# Patient Record
Sex: MALE | Race: WHITE | NOT HISPANIC OR LATINO | URBAN - METROPOLITAN AREA
[De-identification: names, ages, dates, MRNs, and addresses within clinical notes are randomized per-mention and may not be internally consistent; named-entity substitution may affect disease eponyms.]

---

## 2020-02-28 NOTE — PATIENT DISCUSSION
[facility] Visual Field 36 point screen: I have reviewed the visual fields both taped and untaped on this patient which demonstrate significant obstruction of the patient's peripheral visual field on both eyes.

## 2020-06-26 NOTE — PATIENT DISCUSSION
All sutures removed without difficulty today. Reviewed sun avoidance. Sunscreen given today. Patient will follow up in one month for continued observation.

## 2022-06-30 ENCOUNTER — TECH ONLY (OUTPATIENT)
Facility: LOCATION | Age: 77
End: 2022-06-30

## 2022-06-30 DIAGNOSIS — H52.223: ICD-10-CM

## 2022-06-30 DIAGNOSIS — H52.4: ICD-10-CM

## 2022-06-30 DIAGNOSIS — H25.813: ICD-10-CM

## 2022-06-30 DIAGNOSIS — H52.13: ICD-10-CM

## 2022-06-30 PROCEDURE — 90000 N/C VISIT: CPT

## 2022-06-30 PROCEDURE — 92025 CPTRIZED CORNEAL TOPOGRAPHY: CPT

## 2022-06-30 PROCEDURE — 92136 OPHTHALMIC BIOMETRY: CPT

## 2022-07-19 ENCOUNTER — ESTABLISHED PATIENT (OUTPATIENT)
Facility: LOCATION | Age: 77
End: 2022-07-19

## 2022-07-19 DIAGNOSIS — H02.054: ICD-10-CM

## 2022-07-19 DIAGNOSIS — H25.813: ICD-10-CM

## 2022-07-19 PROCEDURE — 99213 OFFICE O/P EST LOW 20 MIN: CPT

## 2022-07-19 PROCEDURE — 67820 REVISE EYELASHES: CPT

## 2022-07-19 ASSESSMENT — VISUAL ACUITY
OD_PH: 20/25-1
OS_SC: 20/25
OD_SC: 20/50

## 2022-07-19 ASSESSMENT — TONOMETRY
OD_IOP_MMHG: 16
OS_IOP_MMHG: 16

## 2022-07-27 ENCOUNTER — SURGERY/PROCEDURE (OUTPATIENT)
Facility: LOCATION | Age: 77
End: 2022-07-27

## 2022-07-27 DIAGNOSIS — H25.813: ICD-10-CM

## 2022-07-27 PROCEDURE — 66984AV REMOVE CATARACT, INSERT ADVANCED LENS

## 2022-07-28 ENCOUNTER — POST-OP (OUTPATIENT)
Facility: LOCATION | Age: 77
End: 2022-07-28

## 2022-07-28 DIAGNOSIS — Z96.1: ICD-10-CM

## 2022-07-28 DIAGNOSIS — H52.223: ICD-10-CM

## 2022-07-28 PROCEDURE — 99024 POSTOP FOLLOW-UP VISIT: CPT

## 2022-07-28 ASSESSMENT — TONOMETRY
OD_IOP_MMHG: 18
OS_IOP_MMHG: 14

## 2022-07-28 ASSESSMENT — VISUAL ACUITY
OS_SC: 20/30
OD_SC: 20/30

## 2022-08-10 ENCOUNTER — SURGERY/PROCEDURE (OUTPATIENT)
Facility: LOCATION | Age: 77
End: 2022-08-10

## 2022-08-10 DIAGNOSIS — H25.812: ICD-10-CM

## 2022-08-10 PROCEDURE — 66984AV REMOVE CATARACT, INSERT ADVANCED LENS

## 2022-08-11 ENCOUNTER — POST-OP (OUTPATIENT)
Facility: LOCATION | Age: 77
End: 2022-08-11

## 2022-08-11 DIAGNOSIS — Z96.1: ICD-10-CM

## 2022-08-11 DIAGNOSIS — H52.223: ICD-10-CM

## 2022-08-11 PROCEDURE — 99024 POSTOP FOLLOW-UP VISIT: CPT

## 2022-08-11 ASSESSMENT — VISUAL ACUITY
OD_SC: 20/20-2
OU_SC: J2
OS_SC: J5
OD_SC: J1
OS_SC: 20/30
OU_SC: 20/20-2

## 2022-08-11 ASSESSMENT — TONOMETRY
OS_IOP_MMHG: 19
OD_IOP_MMHG: 15

## 2022-08-29 ENCOUNTER — POST-OP (OUTPATIENT)
Facility: LOCATION | Age: 77
End: 2022-08-29

## 2022-08-29 DIAGNOSIS — H52.223: ICD-10-CM

## 2022-08-29 DIAGNOSIS — Z96.1: ICD-10-CM

## 2022-08-29 PROCEDURE — 99024 POSTOP FOLLOW-UP VISIT: CPT

## 2022-08-29 ASSESSMENT — VISUAL ACUITY
OD_SC: J1+
OU_SC: 20/20-2
OD_SC: 20/20-1
OS_SC: J2
OU_SC: J1+
OS_SC: 20/30-2

## 2022-08-29 ASSESSMENT — TONOMETRY
OD_IOP_MMHG: 11
OD_IOP_MMHG: 12
OS_IOP_MMHG: 8
OS_IOP_MMHG: 10

## 2022-09-26 ENCOUNTER — POST-OP (OUTPATIENT)
Facility: LOCATION | Age: 77
End: 2022-09-26

## 2022-09-26 DIAGNOSIS — Z96.1: ICD-10-CM

## 2022-09-26 DIAGNOSIS — H52.223: ICD-10-CM

## 2022-09-26 PROCEDURE — 99024 POSTOP FOLLOW-UP VISIT: CPT

## 2022-09-26 PROCEDURE — 92134 CPTRZ OPH DX IMG PST SGM RTA: CPT

## 2022-09-26 ASSESSMENT — TONOMETRY
OD_IOP_MMHG: 16
OS_IOP_MMHG: 12

## 2022-09-26 ASSESSMENT — VISUAL ACUITY
OD_PH: 20/25-2
OU_SC: J1+-2
OD_SC: 20/30-2
OU_SC: 20/25-2
OD_SC: J1+-1
OS_SC: 20/40+1
OS_SC: J10

## 2022-11-09 ENCOUNTER — CONSULTATION/EVALUATION (OUTPATIENT)
Facility: LOCATION | Age: 77
End: 2022-11-09

## 2022-11-09 DIAGNOSIS — H26.493: ICD-10-CM

## 2022-11-09 PROCEDURE — 66821 AFTER CATARACT LASER SURGERY: CPT

## 2022-11-09 ASSESSMENT — VISUAL ACUITY
OS_SC: J1+
OD_SC: J1+
OS_SC: 20/25-1
OD_SC: 20/30-1
OU_SC: J1+
OU_SC: 20/25

## 2022-11-09 ASSESSMENT — TONOMETRY
OD_IOP_MMHG: 14
OS_IOP_MMHG: 11

## 2022-12-06 ENCOUNTER — POST-OP (OUTPATIENT)
Facility: LOCATION | Age: 77
End: 2022-12-06

## 2022-12-06 PROCEDURE — 99024 POSTOP FOLLOW-UP VISIT: CPT

## 2022-12-06 ASSESSMENT — TONOMETRY
OD_IOP_MMHG: 12
OS_IOP_MMHG: 10

## 2022-12-06 ASSESSMENT — VISUAL ACUITY
OD_SC: 20/20-1
OS_SC: 20/25

## 2023-06-27 ENCOUNTER — ESTABLISHED PATIENT (OUTPATIENT)
Facility: LOCATION | Age: 78
End: 2023-06-27

## 2023-06-27 DIAGNOSIS — H02.883: ICD-10-CM

## 2023-06-27 DIAGNOSIS — H02.886: ICD-10-CM

## 2023-06-27 DIAGNOSIS — H16.223: ICD-10-CM

## 2023-06-27 DIAGNOSIS — Z96.1: ICD-10-CM

## 2023-06-27 PROCEDURE — 99213 OFFICE O/P EST LOW 20 MIN: CPT

## 2023-06-27 ASSESSMENT — VISUAL ACUITY
OD_SC: 20/30-2
OS_SC: 20/25

## 2023-06-27 ASSESSMENT — TONOMETRY
OS_IOP_MMHG: 11
OD_IOP_MMHG: 18

## 2023-08-18 ENCOUNTER — ESTABLISHED PATIENT (OUTPATIENT)
Facility: LOCATION | Age: 78
End: 2023-08-18

## 2023-08-18 DIAGNOSIS — H02.883: ICD-10-CM

## 2023-08-18 DIAGNOSIS — S05.02XA: ICD-10-CM

## 2023-08-18 DIAGNOSIS — H16.223: ICD-10-CM

## 2023-08-18 DIAGNOSIS — H02.886: ICD-10-CM

## 2023-08-18 DIAGNOSIS — Z96.1: ICD-10-CM

## 2023-08-18 PROCEDURE — 99213 OFFICE O/P EST LOW 20 MIN: CPT

## 2023-08-18 ASSESSMENT — TONOMETRY
OS_IOP_MMHG: 18
OD_IOP_MMHG: 18

## 2023-08-18 ASSESSMENT — VISUAL ACUITY
OS_SC: 20/25
OD_SC: 20/25

## 2023-10-13 ENCOUNTER — APPOINTMENT (OUTPATIENT)
Dept: URBAN - METROPOLITAN AREA SURGERY 16 | Age: 78
Setting detail: DERMATOLOGY
End: 2023-10-13

## 2023-10-13 DIAGNOSIS — L57.0 ACTINIC KERATOSIS: ICD-10-CM

## 2023-10-13 DIAGNOSIS — L81.4 OTHER MELANIN HYPERPIGMENTATION: ICD-10-CM

## 2023-10-13 DIAGNOSIS — L82.0 INFLAMED SEBORRHEIC KERATOSIS: ICD-10-CM

## 2023-10-13 PROCEDURE — 17110 DESTRUCT B9 LESION 1-14: CPT

## 2023-10-13 PROCEDURE — OTHER LIQUID NITROGEN: OTHER

## 2023-10-13 PROCEDURE — 17000 DESTRUCT PREMALG LESION: CPT | Mod: 59

## 2023-10-13 PROCEDURE — OTHER COUNSELING: OTHER

## 2023-10-13 PROCEDURE — 99203 OFFICE O/P NEW LOW 30 MIN: CPT | Mod: 25

## 2023-10-13 ASSESSMENT — LOCATION SIMPLE DESCRIPTION DERM
LOCATION SIMPLE: RIGHT FOREHEAD
LOCATION SIMPLE: LEFT FOREHEAD
LOCATION SIMPLE: LEFT THIGH

## 2023-10-13 ASSESSMENT — LOCATION DETAILED DESCRIPTION DERM
LOCATION DETAILED: RIGHT FOREHEAD
LOCATION DETAILED: LEFT SUPERIOR FOREHEAD
LOCATION DETAILED: LEFT ANTERIOR PROXIMAL THIGH

## 2023-10-13 ASSESSMENT — LOCATION ZONE DERM
LOCATION ZONE: FACE
LOCATION ZONE: LEG

## 2023-10-13 NOTE — PROCEDURE: LIQUID NITROGEN
Show Topical Anesthesia Variable?: Yes
Duration Of Freeze Thaw-Cycle (Seconds): 2
Application Tool (Optional): Cry-AC
Render Post-Care Instructions In Note?: no
Post-Care Instructions: I reviewed with the patient in detail post-care instructions. Patient is to wear sunprotection, and avoid picking at any of the treated lesions. Pt may apply Vaseline to crusted or scabbing areas.
Number Of Freeze-Thaw Cycles: 2 freeze-thaw cycles
Spray Paint Text: The liquid nitrogen was applied to the skin utilizing a spray paint frosting technique.
Medical Necessity Clause: This procedure was medically necessary because the lesions that were treated were:
Consent: The patient's consent was obtained including but not limited to risks of crusting, scabbing, blistering, scarring, darker or lighter pigmentary change, recurrence, incomplete removal and infection.
Medical Necessity Information: It is in your best interest to select a reason for this procedure from the list below. All of these items fulfill various CMS LCD requirements except the new and changing color options.
Detail Level: Simple
Duration Of Freeze Thaw-Cycle (Seconds): 5
Number Of Freeze-Thaw Cycles: 1 freeze-thaw cycle

## 2023-12-14 ENCOUNTER — APPOINTMENT (OUTPATIENT)
Dept: URBAN - METROPOLITAN AREA SURGERY 16 | Age: 78
Setting detail: DERMATOLOGY
End: 2023-12-15

## 2023-12-14 DIAGNOSIS — L82.0 INFLAMED SEBORRHEIC KERATOSIS: ICD-10-CM

## 2023-12-14 PROCEDURE — OTHER SHAVE REMOVAL: OTHER

## 2023-12-14 PROCEDURE — 11310 SHAVE SKIN LESION 0.5 CM/<: CPT

## 2023-12-14 PROCEDURE — OTHER MIPS QUALITY: OTHER

## 2023-12-14 ASSESSMENT — LOCATION DETAILED DESCRIPTION DERM: LOCATION DETAILED: LEFT SUPERIOR PREAURICULAR CHEEK

## 2023-12-14 ASSESSMENT — LOCATION SIMPLE DESCRIPTION DERM: LOCATION SIMPLE: LEFT CHEEK

## 2023-12-14 ASSESSMENT — LOCATION ZONE DERM: LOCATION ZONE: FACE

## 2023-12-14 NOTE — PROCEDURE: MIPS QUALITY
Quality 226: Preventive Care And Screening: Tobacco Use: Screening And Cessation Intervention: Patient screened for tobacco use and is an ex/non-smoker
Quality 110: Preventive Care And Screening: Influenza Immunization: Influenza Immunization previously received during influenza season
Detail Level: Detailed
Quality 431: Preventive Care And Screening: Unhealthy Alcohol Use - Screening: Patient not identified as an unhealthy alcohol user when screened for unhealthy alcohol use using a systematic screening method
Yes...

## 2023-12-14 NOTE — PROCEDURE: SHAVE REMOVAL
Medical Necessity Information: It is in your best interest to select a reason for this procedure from the list below. All of these items fulfill various CMS LCD requirements except the new and changing color options.
Medical Necessity Clause: This procedure was medically necessary because the lesion that was treated was:
Lab: [id number]
Lab Facility: [id number]
Detail Level: Detailed
Was A Bandage Applied: Yes
Size Of Lesion In Cm (Required): 0.5
Depth Of Shave: dermis
Biopsy Method: Dermablade
Anesthesia Type: 1% lidocaine with epinephrine
Hemostasis: Drysol
Wound Care: Petrolatum
Render Path Notes In Note?: No
Consent was obtained from the patient. The risks and benefits to therapy were discussed in detail. Specifically, the risks of infection, scarring, bleeding, prolonged wound healing, incomplete removal, allergy to anesthesia, nerve injury and recurrence were addressed. Prior to the procedure, the treatment site was clearly identified and confirmed by the patient. All components of Universal Protocol/PAUSE Rule completed.
Post-Care Instructions: I reviewed with the patient in detail post-care instructions. Patient is to keep the biopsy site dry overnight, and then apply bacitracin twice daily until healed. Patient may apply hydrogen peroxide soaks to remove any crusting.
Notification Instructions: Patient will be notified of pathology results. However, patient instructed to call the office if not contacted within 2 weeks.
Billing Type: Third-Party Bill

## 2024-01-09 ENCOUNTER — ESTABLISHED PATIENT (OUTPATIENT)
Facility: LOCATION | Age: 79
End: 2024-01-09

## 2024-01-09 DIAGNOSIS — H43.813: ICD-10-CM

## 2024-01-09 DIAGNOSIS — H16.223: ICD-10-CM

## 2024-01-09 DIAGNOSIS — H40.1112: ICD-10-CM

## 2024-01-09 DIAGNOSIS — H40.1121: ICD-10-CM

## 2024-01-09 PROCEDURE — 99214 OFFICE O/P EST MOD 30 MIN: CPT

## 2024-01-09 PROCEDURE — 92133 CPTRZD OPH DX IMG PST SGM ON: CPT

## 2024-01-09 ASSESSMENT — VISUAL ACUITY
OD_SC: 20/20
OD_SC: J1+-1
OS_SC: 20/25-1
OS_SC: J1+-1

## 2024-01-09 ASSESSMENT — TONOMETRY
OS_IOP_MMHG: 14
OD_IOP_MMHG: 16

## 2024-02-13 ENCOUNTER — ESTABLISHED PATIENT (OUTPATIENT)
Facility: LOCATION | Age: 79
End: 2024-02-13

## 2024-02-13 DIAGNOSIS — H40.1112: ICD-10-CM

## 2024-02-13 DIAGNOSIS — H40.1121: ICD-10-CM

## 2024-02-13 PROCEDURE — 92083 EXTENDED VISUAL FIELD XM: CPT

## 2024-02-13 PROCEDURE — 99214 OFFICE O/P EST MOD 30 MIN: CPT

## 2024-02-13 ASSESSMENT — VISUAL ACUITY
OD_SC: 20/25
OS_SC: 20/25

## 2024-02-13 ASSESSMENT — TONOMETRY
OD_IOP_MMHG: 17
OD_IOP_MMHG: 17
OS_IOP_MMHG: 15
OS_IOP_MMHG: 15

## 2024-03-14 ENCOUNTER — ESTABLISHED PATIENT (OUTPATIENT)
Facility: LOCATION | Age: 79
End: 2024-03-14

## 2024-03-14 DIAGNOSIS — H02.883: ICD-10-CM

## 2024-03-14 DIAGNOSIS — H40.1121: ICD-10-CM

## 2024-03-14 DIAGNOSIS — H40.1112: ICD-10-CM

## 2024-03-14 DIAGNOSIS — H02.886: ICD-10-CM

## 2024-03-14 DIAGNOSIS — Z96.1: ICD-10-CM

## 2024-03-14 PROCEDURE — 99213 OFFICE O/P EST LOW 20 MIN: CPT

## 2024-03-14 ASSESSMENT — VISUAL ACUITY
OS_SC: 20/40
OD_SC: 20/30

## 2024-03-14 ASSESSMENT — TONOMETRY
OS_IOP_MMHG: 14
OD_IOP_MMHG: 10
OD_IOP_MMHG: 10
OS_IOP_MMHG: 14

## 2024-10-07 ENCOUNTER — COMPREHENSIVE EXAM (OUTPATIENT)
Facility: LOCATION | Age: 79
End: 2024-10-07

## 2024-10-07 DIAGNOSIS — H02.881: ICD-10-CM

## 2024-10-07 DIAGNOSIS — H40.1121: ICD-10-CM

## 2024-10-07 DIAGNOSIS — H40.1112: ICD-10-CM

## 2024-10-07 DIAGNOSIS — Z96.1: ICD-10-CM

## 2024-10-07 DIAGNOSIS — H02.884: ICD-10-CM

## 2024-10-07 PROCEDURE — 92083 EXTENDED VISUAL FIELD XM: CPT

## 2024-10-07 PROCEDURE — 99213 OFFICE O/P EST LOW 20 MIN: CPT

## 2024-11-19 ENCOUNTER — APPOINTMENT (OUTPATIENT)
Dept: URBAN - METROPOLITAN AREA SURGERY 16 | Age: 79
Setting detail: DERMATOLOGY
End: 2024-11-19

## 2024-11-19 DIAGNOSIS — L82.1 OTHER SEBORRHEIC KERATOSIS: ICD-10-CM

## 2024-11-19 DIAGNOSIS — L57.0 ACTINIC KERATOSIS: ICD-10-CM

## 2024-11-19 PROCEDURE — 17003 DESTRUCT PREMALG LES 2-14: CPT

## 2024-11-19 PROCEDURE — 99212 OFFICE O/P EST SF 10 MIN: CPT | Mod: 25

## 2024-11-19 PROCEDURE — OTHER COUNSELING: OTHER

## 2024-11-19 PROCEDURE — 17000 DESTRUCT PREMALG LESION: CPT

## 2024-11-19 PROCEDURE — OTHER LIQUID NITROGEN: OTHER

## 2024-11-19 ASSESSMENT — LOCATION ZONE DERM
LOCATION ZONE: FACE
LOCATION ZONE: NOSE

## 2024-11-19 ASSESSMENT — LOCATION DETAILED DESCRIPTION DERM
LOCATION DETAILED: RIGHT SUPERIOR MEDIAL MALAR CHEEK
LOCATION DETAILED: NASAL TIP
LOCATION DETAILED: NASAL DORSUM

## 2024-11-19 ASSESSMENT — LOCATION SIMPLE DESCRIPTION DERM
LOCATION SIMPLE: NOSE
LOCATION SIMPLE: RIGHT CHEEK

## 2024-11-19 NOTE — PROCEDURE: LIQUID NITROGEN
Show Applicator Variable?: Yes
Consent: The patient's consent was obtained including but not limited to risks of crusting, scabbing, blistering, scarring, darker or lighter pigmentary change, recurrence, incomplete removal and infection.
Render Post-Care Instructions In Note?: no
Number Of Freeze-Thaw Cycles: 1 freeze-thaw cycle
Detail Level: Detailed
Post-Care Instructions: I reviewed with the patient in detail post-care instructions. Patient is to wear sunprotection, and avoid picking at any of the treated lesions. Pt may apply Vaseline to crusted or scabbing areas.

## 2025-05-08 ENCOUNTER — COMPREHENSIVE EXAM (OUTPATIENT)
Age: 80
End: 2025-05-08

## 2025-05-08 DIAGNOSIS — H40.1121: ICD-10-CM

## 2025-05-08 DIAGNOSIS — H40.1112: ICD-10-CM

## 2025-05-08 PROCEDURE — 99213 OFFICE O/P EST LOW 20 MIN: CPT

## 2025-05-08 PROCEDURE — 92133 CPTRZD OPH DX IMG PST SGM ON: CPT

## 2025-05-08 PROCEDURE — 92020 GONIOSCOPY: CPT

## 2025-05-22 ENCOUNTER — APPOINTMENT (OUTPATIENT)
Dept: URBAN - METROPOLITAN AREA SURGERY 16 | Age: 80
Setting detail: DERMATOLOGY
End: 2025-06-05

## 2025-05-22 DIAGNOSIS — L82.0 INFLAMED SEBORRHEIC KERATOSIS: ICD-10-CM

## 2025-05-22 DIAGNOSIS — L57.0 ACTINIC KERATOSIS: ICD-10-CM

## 2025-05-22 DIAGNOSIS — R20.2 PARESTHESIA OF SKIN: ICD-10-CM

## 2025-05-22 PROCEDURE — OTHER LIQUID NITROGEN: OTHER

## 2025-05-22 PROCEDURE — 17000 DESTRUCT PREMALG LESION: CPT | Mod: 59

## 2025-05-22 PROCEDURE — OTHER COUNSELING: OTHER

## 2025-05-22 PROCEDURE — 17003 DESTRUCT PREMALG LES 2-14: CPT | Mod: 59

## 2025-05-22 PROCEDURE — 99213 OFFICE O/P EST LOW 20 MIN: CPT | Mod: 25

## 2025-05-22 PROCEDURE — 17110 DESTRUCT B9 LESION 1-14: CPT

## 2025-05-22 PROCEDURE — OTHER MIPS QUALITY: OTHER

## 2025-05-22 ASSESSMENT — LOCATION DETAILED DESCRIPTION DERM
LOCATION DETAILED: LEFT INFERIOR CENTRAL MALAR CHEEK
LOCATION DETAILED: RIGHT MEDIAL UPPER BACK
LOCATION DETAILED: RIGHT CENTRAL TEMPLE

## 2025-05-22 ASSESSMENT — LOCATION SIMPLE DESCRIPTION DERM
LOCATION SIMPLE: LEFT CHEEK
LOCATION SIMPLE: RIGHT TEMPLE
LOCATION SIMPLE: RIGHT UPPER BACK

## 2025-05-22 ASSESSMENT — LOCATION ZONE DERM
LOCATION ZONE: FACE
LOCATION ZONE: TRUNK